# Patient Record
Sex: FEMALE | Race: ASIAN | NOT HISPANIC OR LATINO | URBAN - METROPOLITAN AREA
[De-identification: names, ages, dates, MRNs, and addresses within clinical notes are randomized per-mention and may not be internally consistent; named-entity substitution may affect disease eponyms.]

---

## 2024-04-14 ENCOUNTER — EMERGENCY (EMERGENCY)
Facility: HOSPITAL | Age: 24
LOS: 1 days | Discharge: HOME | End: 2024-04-14
Attending: EMERGENCY MEDICINE | Admitting: EMERGENCY MEDICINE
Payer: MEDICAID

## 2024-04-14 VITALS
RESPIRATION RATE: 18 BRPM | OXYGEN SATURATION: 100 % | HEART RATE: 98 BPM | DIASTOLIC BLOOD PRESSURE: 81 MMHG | SYSTOLIC BLOOD PRESSURE: 115 MMHG | TEMPERATURE: 98 F

## 2024-04-14 VITALS
DIASTOLIC BLOOD PRESSURE: 89 MMHG | RESPIRATION RATE: 18 BRPM | SYSTOLIC BLOOD PRESSURE: 159 MMHG | TEMPERATURE: 99 F | OXYGEN SATURATION: 100 % | HEART RATE: 96 BPM

## 2024-04-14 PROCEDURE — 99284 EMERGENCY DEPT VISIT MOD MDM: CPT

## 2024-04-14 PROCEDURE — 93010 ELECTROCARDIOGRAM REPORT: CPT

## 2024-04-14 RX ORDER — FAMOTIDINE 10 MG/ML
20 INJECTION INTRAVENOUS ONCE
Refills: 0 | Status: COMPLETED | OUTPATIENT
Start: 2024-04-14 | End: 2024-04-14

## 2024-04-14 RX ORDER — ONDANSETRON 8 MG/1
8 TABLET, FILM COATED ORAL ONCE
Refills: 0 | Status: COMPLETED | OUTPATIENT
Start: 2024-04-14 | End: 2024-04-14

## 2024-04-14 RX ADMIN — FAMOTIDINE 20 MILLIGRAM(S): 10 INJECTION INTRAVENOUS at 19:08

## 2024-04-14 RX ADMIN — ONDANSETRON 8 MILLIGRAM(S): 8 TABLET, FILM COATED ORAL at 19:08

## 2024-04-14 RX ADMIN — Medication 30 MILLILITER(S): at 19:08

## 2024-04-14 NOTE — ED PROVIDER NOTE - CLINICAL SUMMARY MEDICAL DECISION MAKING FREE TEXT BOX
pt woith no pmhx p/w near syncopal episode generally witnessed by mother. pt had no seizure like activity, no post ictal confusion, no urinary or fecal incontience. pt had sig amt of etoh yesterday(although not daily drinker), received tattoo yesterday and also had a corona this morning with family event. ekg nsr with neg prolonged qtc, neg hocm, brugaada. pt asymptomatic at this point, with probably dehydration as cause of near syncopal event. vss. tachycardia improved. neg perc afet re eval. vss. stable for d/c with cards f/u.

## 2024-04-14 NOTE — ED PROVIDER NOTE - PATIENT PORTAL LINK FT
You can access the FollowMyHealth Patient Portal offered by [facility] by registering at the following website: [email or web address]. By joining [employer]’s FollowMyHealth portal, you will also be able to view your health information using other applications (apps) compatible with our system.

## 2024-04-14 NOTE — ED PROVIDER NOTE - PSYCHIATRIC, MLM
Alert and oriented, no focal deficits, no motor or sensory deficits.
Alert and oriented to person, place, time/situation. normal mood and affect. no apparent risk to self or others.

## 2024-04-14 NOTE — ED ADULT TRIAGE NOTE - CHIEF COMPLAINT QUOTE
Patient s/p pre-syncopal episode in the bathroom. Denies PHx- Breathing non-labored. Patiet ambulatory.

## 2024-04-14 NOTE — ED PROVIDER NOTE - OBJECTIVE STATEMENT
23-year-old female presents to the emergency department withNear syncopal event at home today.  Patient states that shedrinking yesterdayI also got a tattoo yesterday.  Patient also admits to having corona this morning.  [place] to had a queasy stomach.  Patient had multiple episodes of colicky type cramping vague diffuse abdominal pain.  And had 1 episode of nausea and vomiting.  Nonbloody nonbilious.  Patient states she felt lightheaded and called her mother in the room and they came up she was altered for less than 2 minutes.  Patient no seizure-like activity no postictal confusion.  No history of seizures no urinary incontinence or tongue biting.  Patient states initially felt mildly nauseous and had 1 episode of vomiting but since then is currently asymptomatic.  Hemodynamically stable well-appearing able to tolerate p.o. here

## 2024-11-18 NOTE — ED ADULT TRIAGE NOTE - NS ED NURSE AMBULANCES
Detail Level: Generalized Detail Level: Detailed [facility] Detail Level: Zone